# Patient Record
Sex: FEMALE | URBAN - METROPOLITAN AREA
[De-identification: names, ages, dates, MRNs, and addresses within clinical notes are randomized per-mention and may not be internally consistent; named-entity substitution may affect disease eponyms.]

---

## 2022-01-31 ENCOUNTER — APPOINTMENT (RX ONLY)
Dept: URBAN - METROPOLITAN AREA CLINIC 154 | Facility: CLINIC | Age: 62
Setting detail: DERMATOLOGY
End: 2022-01-31

## 2022-01-31 DIAGNOSIS — Z41.9 ENCOUNTER FOR PROCEDURE FOR PURPOSES OTHER THAN REMEDYING HEALTH STATE, UNSPECIFIED: ICD-10-CM

## 2022-01-31 PROCEDURE — ? COSMETIC CONSULTATION: PALOMAR LASER

## 2022-01-31 PROCEDURE — ? COSMETIC CONSULTATION - MICRO-NEEDLING

## 2022-01-31 PROCEDURE — ? COSMETIC CONSULTATION: LASER RESURFACING

## 2022-01-31 PROCEDURE — ? COSMETIC CONSULTATION: SKIN CARE PRODUCTS AND SERVICES

## 2022-01-31 PROCEDURE — ? COSMETIC CONSULTATION: SKIN TIGHTENING

## 2022-03-01 ENCOUNTER — RX ONLY (OUTPATIENT)
Age: 62
Setting detail: RX ONLY
End: 2022-03-01

## 2022-03-01 RX ORDER — MINOCYCLINE HYDROCHLORIDE 100 MG/1
CAPSULE ORAL
Qty: 28 | Refills: 0 | Status: ERX | COMMUNITY
Start: 2022-03-01

## 2022-03-04 ENCOUNTER — APPOINTMENT (RX ONLY)
Dept: URBAN - METROPOLITAN AREA CLINIC 154 | Facility: CLINIC | Age: 62
Setting detail: DERMATOLOGY
End: 2022-03-04

## 2022-03-04 DIAGNOSIS — Z41.9 ENCOUNTER FOR PROCEDURE FOR PURPOSES OTHER THAN REMEDYING HEALTH STATE, UNSPECIFIED: ICD-10-CM

## 2022-03-04 PROCEDURE — ? SCITON HALO PRO

## 2022-03-04 ASSESSMENT — LOCATION ZONE DERM: LOCATION ZONE: FACE

## 2022-03-04 ASSESSMENT — LOCATION DETAILED DESCRIPTION DERM
LOCATION DETAILED: INFERIOR MID FOREHEAD
LOCATION DETAILED: RIGHT INFERIOR CENTRAL MALAR CHEEK
LOCATION DETAILED: LEFT INFERIOR CENTRAL MALAR CHEEK

## 2022-03-04 ASSESSMENT — LOCATION SIMPLE DESCRIPTION DERM
LOCATION SIMPLE: LEFT CHEEK
LOCATION SIMPLE: RIGHT CHEEK
LOCATION SIMPLE: INFERIOR FOREHEAD

## 2022-03-04 NOTE — PROCEDURE: SCITON HALO PRO
2940 (Ablative) Depth (Won't Render If N/A): 20 microns
2940 (Ablative) Density: 5%
2940 (Ablative) Density: 10%
1470 (Coagulation) Density: 15%
2940 (Ablative) Density: 12%
Anesthesia Volume In Cc: 0
Consent: Written consent obtained, risks reviewed including but not limited to crusting, scabbing, blistering, scarring, darker or lighter pigmentary change, bruising, and/or incomplete response.
Detail Level: Zone
1470 (Coagulation) Depth (Won't Render If N/A): 275 microns
Preprocedure Text: The treatment areas were thoroughly cleaned. Any exposed at risk hair that was not to be treated was covered in white paper tape. Clear ultrasound gel was applied to the treatment areas. The treatment areas were treated using the parameters noted above.
Post-Care Instructions: I reviewed with the patient in detail post-care instructions. Patient should stay away from the sun and wear sun protection until treated areas are fully healed.
1470 (Coagulation) Depth (Won't Render If N/A): 350 microns
Treatment Number: 1
1470 (Coagulation) Depth (Won't Render If N/A): 400 microns
Post Procedure Text: The patient tolerated the procedure well. Ice-chilled washclothes were applied to the treatment area for comfort. Post care was reviewed with the patient.

## 2024-09-04 ENCOUNTER — APPOINTMENT (RX ONLY)
Dept: URBAN - NONMETROPOLITAN AREA CLINIC 25 | Facility: CLINIC | Age: 64
Setting detail: DERMATOLOGY
End: 2024-09-04

## 2024-09-04 DIAGNOSIS — L57.8 OTHER SKIN CHANGES DUE TO CHRONIC EXPOSURE TO NONIONIZING RADIATION: ICD-10-CM

## 2024-09-04 DIAGNOSIS — B35.1 TINEA UNGUIUM: ICD-10-CM | Status: INADEQUATELY CONTROLLED

## 2024-09-04 PROCEDURE — ? SUNSCREEN RECOMMENDATIONS

## 2024-09-04 PROCEDURE — ? VENIPUNCTURE

## 2024-09-04 PROCEDURE — ? COUNSELING

## 2024-09-04 PROCEDURE — ? ORDER TESTS

## 2024-09-04 PROCEDURE — 36415 COLL VENOUS BLD VENIPUNCTURE: CPT

## 2024-09-04 PROCEDURE — ? TREATMENT REGIMEN

## 2024-09-04 PROCEDURE — ? ADDITIONAL NOTES

## 2024-09-04 PROCEDURE — ? PRESCRIPTION MEDICATION MANAGEMENT

## 2024-09-04 PROCEDURE — 99214 OFFICE O/P EST MOD 30 MIN: CPT

## 2024-09-04 PROCEDURE — ? PRESCRIPTION

## 2024-09-04 RX ADMIN — TERBINAFINE HYDROCHLORIDE: 250 TABLET ORAL at 00:00

## 2024-09-04 ASSESSMENT — LOCATION DETAILED DESCRIPTION DERM
LOCATION DETAILED: RIGHT 3RD TOENAIL
LOCATION DETAILED: LEFT PROXIMAL POSTERIOR UPPER ARM
LOCATION DETAILED: LEFT PROXIMAL POSTERIOR UPPER ARM
LOCATION DETAILED: RIGHT 3RD TOENAIL
LOCATION DETAILED: RIGHT 2ND TOENAIL
LOCATION DETAILED: RIGHT PROXIMAL POSTERIOR UPPER ARM

## 2024-09-04 ASSESSMENT — LOCATION SIMPLE DESCRIPTION DERM
LOCATION SIMPLE: RIGHT 3RD TOE
LOCATION SIMPLE: RIGHT 2ND TOE
LOCATION SIMPLE: RIGHT 3RD TOE
LOCATION SIMPLE: RIGHT POSTERIOR UPPER ARM
LOCATION SIMPLE: LEFT POSTERIOR UPPER ARM
LOCATION SIMPLE: LEFT POSTERIOR UPPER ARM

## 2024-09-04 ASSESSMENT — LOCATION ZONE DERM
LOCATION ZONE: ARM
LOCATION ZONE: ARM
LOCATION ZONE: TOENAIL
LOCATION ZONE: TOENAIL

## 2024-09-04 NOTE — PROCEDURE: PRESCRIPTION MEDICATION MANAGEMENT
Render In Strict Bullet Format?: No
Detail Level: Zone
Continue Regimen: Lamisil 250mg QD quantity 30 tabs.

## 2024-09-04 NOTE — PROCEDURE: ORDER TESTS
Performing Laboratory: [id number]
Billing Type: Third-Party Bill
Expected Date Of Service: 09/04/2024
Bill For Surgical Tray: no

## 2024-09-04 NOTE — PROCEDURE: TREATMENT REGIMEN
Continue Regimen: Lamisil 250 mg QD for 30 days, this will place the patient at 90 days of treatment. Advised patient to follow up in one month for exam.
Detail Level: Detailed

## 2024-09-04 NOTE — PROCEDURE: ADDITIONAL NOTES
Detail Level: Simple
Additional Notes: Patient has been treating her onychomycosis with lamisil for two months, discussed the need for lab draw at this time. Discussed with patient the different therapies used to treat this diagnosis and time of treatment. Patient will continue lamisil 250mg daily for total of three months. Patient had concern about pigmented areas noted on the second toe nail. Discussed with her how a melanoma would present vs how the pigment she has … black dots may be related to micro trauma to the nail or from the infection. No pigment is noted to involve the nail fold and no pigment is appreciated involving the nail.
Render Risk Assessment In Note?: no

## 2024-09-05 RX ORDER — TERBINAFINE HYDROCHLORIDE 250 MG/1
TABLET ORAL QD
Qty: 30 | Refills: 0 | Status: ERX | COMMUNITY
Start: 2024-09-04

## 2024-10-07 ENCOUNTER — RX ONLY (OUTPATIENT)
Age: 64
Setting detail: RX ONLY
End: 2024-10-07

## 2024-10-07 RX ORDER — TERBINAFINE HYDROCHLORIDE 250 MG/1
TABLET ORAL QD
Qty: 30 | Refills: 0 | Status: ERX

## 2024-11-18 ENCOUNTER — APPOINTMENT (RX ONLY)
Dept: URBAN - NONMETROPOLITAN AREA CLINIC 25 | Facility: CLINIC | Age: 64
Setting detail: DERMATOLOGY
End: 2024-11-18

## 2024-11-18 DIAGNOSIS — B35.1 TINEA UNGUIUM: ICD-10-CM | Status: INADEQUATELY CONTROLLED

## 2024-11-18 PROCEDURE — ? COUNSELING

## 2024-11-18 PROCEDURE — ? TREATMENT REGIMEN

## 2024-11-18 PROCEDURE — 99213 OFFICE O/P EST LOW 20 MIN: CPT

## 2024-11-18 PROCEDURE — ? ADDITIONAL NOTES

## 2024-11-18 ASSESSMENT — LOCATION DETAILED DESCRIPTION DERM
LOCATION DETAILED: RIGHT 2ND TOENAIL
LOCATION DETAILED: RIGHT 3RD TOENAIL

## 2024-11-18 ASSESSMENT — LOCATION SIMPLE DESCRIPTION DERM
LOCATION SIMPLE: RIGHT 3RD TOE
LOCATION SIMPLE: RIGHT 2ND TOE

## 2024-11-18 ASSESSMENT — LOCATION ZONE DERM: LOCATION ZONE: TOENAIL

## 2024-11-18 NOTE — PROCEDURE: TREATMENT REGIMEN
Detail Level: Detailed
Plan: Patient has been talking the terbinafine for 3 months with little change in nail. Discussed with patient of resistance to oral medications. Would like to discontinue all oral fungal medications and perform a PAS in six week.
Discontinue Regimen: Terbinafine

## 2024-11-18 NOTE — PROCEDURE: ADDITIONAL NOTES
Additional Notes: Discontinued Terbinafine no improvement noted discussed follow up for 6 weeks for nail clipping and PAS
Render Risk Assessment In Note?: no
Detail Level: Simple

## 2024-12-30 ENCOUNTER — APPOINTMENT (OUTPATIENT)
Dept: URBAN - NONMETROPOLITAN AREA CLINIC 25 | Facility: CLINIC | Age: 64
Setting detail: DERMATOLOGY
End: 2024-12-30

## 2024-12-30 DIAGNOSIS — B35.1 TINEA UNGUIUM: ICD-10-CM | Status: INADEQUATELY CONTROLLED

## 2024-12-30 PROBLEM — L60.9 NAIL DISORDER, UNSPECIFIED: Status: ACTIVE | Noted: 2024-12-30

## 2024-12-30 PROCEDURE — ? ADDITIONAL NOTES

## 2024-12-30 PROCEDURE — ? NAIL CLIPPING FOR PAS

## 2024-12-30 ASSESSMENT — LOCATION SIMPLE DESCRIPTION DERM: LOCATION SIMPLE: RIGHT 2ND TOE

## 2024-12-30 ASSESSMENT — LOCATION DETAILED DESCRIPTION DERM: LOCATION DETAILED: RIGHT 2ND TOENAIL

## 2024-12-30 ASSESSMENT — LOCATION ZONE DERM: LOCATION ZONE: TOENAIL

## 2024-12-30 NOTE — PROCEDURE: ADDITIONAL NOTES
Render Risk Assessment In Note?: no
Detail Level: Simple
Additional Notes: Patient will follow up in 6 weeks for evaluation and nail clipping. Patient is currently not on any treatment for toenails.

## 2025-02-10 ENCOUNTER — APPOINTMENT (OUTPATIENT)
Dept: URBAN - NONMETROPOLITAN AREA CLINIC 25 | Facility: CLINIC | Age: 65
Setting detail: DERMATOLOGY
End: 2025-02-10

## 2025-02-10 VITALS — WEIGHT: 125 LBS | HEIGHT: 65 IN

## 2025-02-10 DIAGNOSIS — B35.1 TINEA UNGUIUM: ICD-10-CM

## 2025-02-10 PROBLEM — L60.9 NAIL DISORDER, UNSPECIFIED: Status: ACTIVE | Noted: 2025-02-10

## 2025-02-10 PROCEDURE — ? COUNSELING

## 2025-02-10 PROCEDURE — ? TREATMENT REGIMEN

## 2025-02-10 PROCEDURE — ? NAIL CLIPPING FOR PAS

## 2025-02-10 PROCEDURE — 99213 OFFICE O/P EST LOW 20 MIN: CPT

## 2025-02-10 ASSESSMENT — LOCATION ZONE DERM: LOCATION ZONE: TOENAIL

## 2025-02-10 ASSESSMENT — LOCATION DETAILED DESCRIPTION DERM: LOCATION DETAILED: RIGHT 2ND TOENAIL

## 2025-02-10 ASSESSMENT — LOCATION SIMPLE DESCRIPTION DERM: LOCATION SIMPLE: RIGHT 2ND TOE
